# Patient Record
Sex: MALE | Race: WHITE | NOT HISPANIC OR LATINO | Employment: UNEMPLOYED | URBAN - METROPOLITAN AREA
[De-identification: names, ages, dates, MRNs, and addresses within clinical notes are randomized per-mention and may not be internally consistent; named-entity substitution may affect disease eponyms.]

---

## 2022-03-24 ENCOUNTER — MEDICAL CORRESPONDENCE (OUTPATIENT)
Dept: HEALTH INFORMATION MANAGEMENT | Facility: CLINIC | Age: 67
End: 2022-03-24
Payer: COMMERCIAL

## 2022-03-28 ENCOUNTER — TRANSCRIBE ORDERS (OUTPATIENT)
Dept: OTHER | Age: 67
End: 2022-03-28
Payer: COMMERCIAL

## 2022-03-28 DIAGNOSIS — K43.5 PARASTOMAL HERNIA WITHOUT OBSTRUCTION OR GANGRENE: Primary | ICD-10-CM

## 2022-03-29 ENCOUNTER — TELEPHONE (OUTPATIENT)
Dept: SURGERY | Facility: CLINIC | Age: 67
End: 2022-03-29
Payer: COMMERCIAL

## 2022-03-29 NOTE — TELEPHONE ENCOUNTER
"Patient has colostomy with parastomal hernia. (will see Gen Surgery for this). Colostomy was placed 1 year ago. Has rectal pain that \"makes his feet numb\". He states it is constant, dull pressure. Nothing makes this better or worse. He still has adequate output from his colostomy. Patient will see Dr. Franklin on June 14th.     Samantha Cordova RN BSN  RNCC Colon Rectal Surgery  [phone]    "

## 2022-03-30 NOTE — TELEPHONE ENCOUNTER
Diagnosis, Referred by & from: S/P colostomy, rectal pain   Appt date: 6/14/2022   NOTES STATUS DETAILS   OFFICE NOTE from referring provider Care Everywhere [facility]:  3/24/22 - GEN SURG OV with Dr. Porter   OFFICE NOTE from other specialist Care Everywhere [facility]:  3/31/22 - PCC OV with Des Whelan NP  3/4/22 - URO OV with Dr. Russo  12/3/21 - PCC OV with Des Whelan NP  3/23/21 - GI OV with Markell Allen NP  4/6/20 - GI OV with Dr. Parrish    [facility]:  3/23/18 - URO OV with Dr. Zuluaga   DISCHARGE SUMMARY from hospital Care Everywhere [facility]:  4/14/21 - Admission with Dr. Villaseñor  2/24/21 - Admission with Dr. Sebastian   DISCHARGE REPORT from the ER Care Everywhere [facility]:  4/27/21 - ED OV with Dr. Moreno  12/5/19 - ED OV with Dr. Sebastian  1/8/19 - ED OV with Dr. Pride  7/11/18 - ED OV with Dr. Ambrose  7/9/18 - ED OV with Dr. Javier   OPERATIVE REPORT Care Everywhere [facility]:  4/14/21 - OP Note for ROBOTIC COLOSTOMY with Dr. Villaseñor  4/28/20 - OP Note for MANOMETRY ANORECTAL with Dr. Reynolds   MEDICATION LIST Care Everywhere    LABS     BIOPSIES/PATHOLOGY RELATED TO DIAGNOSIS Care Everywhere [facility]:  2/27/20 - Colon Biopsy (Case: [id number])   DIAGNOSTIC PROCEDURES     COLONOSCOPY Care Everywhere [facility]:  2/27/20 - Colonoscopy    [facility]:  10/30/17 - Colonoscopy   IMAGING (DISC & REPORT)      CT Received [facility]:  4/27/21 - CT Abd/Pelvis  2/24/21 - CT Abd/Pelvis  12/5/19 - CT Abd/Pelvis  7/11/18 - CT Abd/Pelvis   MRI Received [facility]:  4/15/22 - MRI Pelvis   XRAY Received [facility]:  12/3/21 - XR Abdomen  9/28/21 - XR Abdomen  4/27/21 - XR Abdomen  8/2/20 - XR Abdomen  11/19/19 - XR Abdomen   ULTRASOUND  (ENDOANAL/ENDORECTAL) N/A      Records Requested  03/30/22    Facility  [facility]  Fax: [phone]   Outcome * 3/30/22 8:18 AM Faxed req to [facility] for images to be pushed to [facility]. - [place]    * 3/31/22 11:46 AM Imaging disc received from [facility] and sent to [facility] to  be uploaded into PACs. - [place]    * 5/17/22 6:48 AM Faxed urg req to [facility] for additional images to be sent to the clinic. - [place]    * 5/18/22 1:22 PM Imaging disc received from [facility] and sent to [facility] to be uploaded into PACs. - [place]

## 2022-06-07 ENCOUNTER — PATIENT OUTREACH (OUTPATIENT)
Dept: SURGERY | Facility: CLINIC | Age: 67
End: 2022-06-07
Payer: COMMERCIAL

## 2022-06-07 NOTE — PROGRESS NOTES
"Colon and Rectal Surgery Clinic Note    RE: Zach Costa.  : 1955.  TENZIN: 2022.    Reason for visit: rectal pain.    HPI: Zach \"Bruna" is a 66 year old male who presents today for rectal pain. Quentin has a spinal injury resulting in neurogenic bladder and bowel and underwent a robotic diverting sigmoid colostomy with Dr. Charley Villaseñor on 2021. Since then he has developed a parastomal hernia and has issues with chronic abdominal and rectal pain. A MRI was completed on 4/15/2022 which demonstrated no T2 signal abnormality or concerns. His last colonscopy was in  which revealed multiple adenomatous polyps.     Today Quentin feels like the pain is getting worse.  The pain he describes seems to be in his pelvis, rather than his rectum or anus, and sometimes traverses down to his toes.      MRI Pelvis (4/15/2022):   IMPRESSION:   1. The visualized lumbar sacral plexus nerve roots demonstrate no evidence for   enlargement. No high T2 signal abnormality. This is from the level L5-S1 to the   sacrum. If still clinically concerned contrast administration with full-field   pelvis may be helpful.   2. Boogie bulb is within the prostatic urethra.        Colonoscopy (2022):  Impression:  - The examined portion of the ileum was normal. Biopsied.  - The rectum, sigmoid colon, descending colon, transverse colon,   ascending colon and cecum are normal. Biopsied.  - Two 6 to 10 mm polyps in the ascending colon, removed with a hot   snare. Resected and retrieved.  - Two small polyps in the transverse colon, removed with a hot snare.   Resected and retrieved.  - Four 4 to 7 mm polyps at the recto-sigmoid colon and in the sigmoid   colon, removed with a hot snare. Resected and retrieved.  - One small polyp in the rectum, removed with a jumbo cold forceps.   Resected and retrieved.  - Non-bleeding internal hemorrhoids  - Lack of tone observed in entire colon.  - Decreased sphincter tone found on digital rectal " exam.      Surgical Pathology (2/27/2020):   A. Terminal ileum biopsies:  - No diagnostic abnormality.    B. Random colon biopsies:  - No diagnostic abnormality.    C. Ascending colon polyp:  - Tubular adenoma.    D. Transverse colon polyps:  - Tubular adenomas.    E. Rectosigmoid colon polyps:  - Tubular adenomas and hyperplastic polyps.       Medical history:  1. Spinal cord injury   2. Neurogenic bowel and bladder   3. HTN   4. Bilateral heel peripheral neuropathy     Surgical history:  1. s/p Gamma nail for right intertrochanteric femur fracture  2. Robotic diverting colostomy     Family history:  No IBD or GI malignancy    Medications:  -not on anticoagulation or steroids    Allergies:  Not on File    Social history:   -he is a retired   -he is still smoking but has cut down    ROS:  A complete review of systems was performed with the patient and all systems negative except as per HPI.    Physical Examination:  Exam was chaperoned by   There were no vitals taken for this visit.  General: Well hydrated. No acute distress.  Abdomen: Soft, NT, very large parastomal hernia, colostomy in LLQ, functioning  Perianal external examination:  Perianal skin: Intact with no excoriation or lichenification.  Lesions: No evidence of an external lesion, nodularity, or induration in the perianal region.  Eversion of buttocks: There was not evidence of an anal fissure. Details: N/A.  Skin tags or external hemorrhoids: None.  Digital rectal examination: Was performed.   Sphincter tone: Poor.  Palpable lesions: No.  Other: None.  Bimanual examination: was not performed.    Anoscopy: Was performed.   Hemorrhoids: No significant internal hemorrhoids.  Lesions: No    Procedures:  None.    ASSESSMENT  65 y/o gentleman with a colostomy and a very large parastomal hernia and what I believe in neurogenic pain in his pelvis.  No organic cause of rectal or pelvic pain identified.    Risks, benefits, and alternatives of operative  treatment were thoroughly discussed with the patient, he/she understands these well and agrees to proceed.    PLAN  1. Referral to parastomal hernia specialist  2. Referral to pain clinic   3. Advised smoking cessation    45 minutes spent on the date of the encounter doing chart review, history and exam, imaging review, documentation and further activities as noted above.      Rodriguez Franklin MD, PhD    Division of Colon and Rectal Surgery  [facility]    Referring Provider:  Samuel Porter MD  [facility] [place] [address]     Primary Care Provider:  No primary care provider on file.

## 2022-06-07 NOTE — PROGRESS NOTES
Called patient in an attempt to schedule a General Surgery consult for parastomal hernia. Patients mailbox is full and cannot accept messages. Patient can be scheduled with Tiffanie Del Real or Richie, next available opening.

## 2022-06-14 ENCOUNTER — PRE VISIT (OUTPATIENT)
Dept: SURGERY | Facility: CLINIC | Age: 67
End: 2022-06-14
Payer: COMMERCIAL

## 2022-06-14 ENCOUNTER — OFFICE VISIT (OUTPATIENT)
Dept: SURGERY | Facility: CLINIC | Age: 67
End: 2022-06-14
Payer: COMMERCIAL

## 2022-06-14 VITALS
WEIGHT: 193 LBS | HEIGHT: 71 IN | HEART RATE: 78 BPM | DIASTOLIC BLOOD PRESSURE: 85 MMHG | SYSTOLIC BLOOD PRESSURE: 140 MMHG | OXYGEN SATURATION: 96 % | BODY MASS INDEX: 27.02 KG/M2

## 2022-06-14 DIAGNOSIS — K62.89 RECTAL PAIN: ICD-10-CM

## 2022-06-14 DIAGNOSIS — K43.5 PARASTOMAL HERNIA WITHOUT OBSTRUCTION OR GANGRENE: ICD-10-CM

## 2022-06-14 DIAGNOSIS — R10.84 ABDOMINAL PAIN, GENERALIZED: Primary | ICD-10-CM

## 2022-06-14 PROCEDURE — 99204 OFFICE O/P NEW MOD 45 MIN: CPT | Performed by: SURGERY

## 2022-06-14 RX ORDER — ASPIRIN 325 MG
325 TABLET, DELAYED RELEASE (ENTERIC COATED) ORAL
COMMUNITY
Start: 2022-03-10

## 2022-06-14 RX ORDER — DICYCLOMINE HYDROCHLORIDE 10 MG/1
CAPSULE ORAL
COMMUNITY
Start: 2022-03-31

## 2022-06-14 RX ORDER — TRAMADOL HYDROCHLORIDE 50 MG/1
TABLET ORAL
COMMUNITY
Start: 2022-05-10

## 2022-06-14 RX ORDER — LACTULOSE 10 G/15ML
SOLUTION ORAL
COMMUNITY

## 2022-06-14 RX ORDER — LISINOPRIL 10 MG/1
10 TABLET ORAL
COMMUNITY
Start: 2021-04-08

## 2022-06-14 RX ORDER — METOCLOPRAMIDE 10 MG/1
TABLET ORAL
COMMUNITY

## 2022-06-14 RX ORDER — FAMOTIDINE 20 MG/1
TABLET, FILM COATED ORAL
COMMUNITY

## 2022-06-14 RX ORDER — DICYCLOMINE HYDROCHLORIDE 10 MG/1
10 CAPSULE ORAL
COMMUNITY
Start: 2022-03-31

## 2022-06-14 RX ORDER — HYDROCHLOROTHIAZIDE 12.5 MG/1
CAPSULE ORAL
COMMUNITY

## 2022-06-14 RX ORDER — ROPINIROLE 1 MG/1
1 TABLET, FILM COATED ORAL
COMMUNITY
Start: 2022-03-22

## 2022-06-14 RX ORDER — CYCLOBENZAPRINE HCL 10 MG
10 TABLET ORAL
COMMUNITY
Start: 2022-03-31

## 2022-06-14 RX ORDER — GABAPENTIN 300 MG/1
300 CAPSULE ORAL
COMMUNITY
Start: 2022-03-23

## 2022-06-14 RX ORDER — OXYCODONE AND ACETAMINOPHEN 5; 325 MG/1; MG/1
TABLET ORAL
COMMUNITY
Start: 2022-06-03

## 2022-06-14 RX ORDER — AMITRIPTYLINE HYDROCHLORIDE 10 MG/1
10 TABLET ORAL AT BEDTIME
COMMUNITY
Start: 2021-11-26

## 2022-06-14 RX ORDER — OXYCODONE HYDROCHLORIDE 5 MG/1
TABLET ORAL
COMMUNITY
Start: 2021-04-28

## 2022-06-14 RX ORDER — POLYETHYLENE GLYCOL 3350 17 G/17G
17 POWDER, FOR SOLUTION ORAL
COMMUNITY
Start: 2022-03-23

## 2022-06-14 RX ORDER — METHOCARBAMOL 500 MG/1
TABLET, FILM COATED ORAL
COMMUNITY

## 2022-06-14 RX ORDER — MAGNESIUM GLUCONATE 30 MG(550)
TABLET ORAL
COMMUNITY

## 2022-06-14 RX ORDER — MAGNESIUM CITRATE
SOLUTION, ORAL ORAL
COMMUNITY

## 2022-06-14 RX ORDER — DULOXETIN HYDROCHLORIDE 60 MG/1
CAPSULE, DELAYED RELEASE ORAL
COMMUNITY

## 2022-06-14 RX ORDER — OXYBUTYNIN CHLORIDE 10 MG/1
TABLET, EXTENDED RELEASE ORAL
COMMUNITY
Start: 2021-11-08

## 2022-06-14 RX ORDER — BACLOFEN 10 MG/1
TABLET ORAL
COMMUNITY

## 2022-06-14 RX ORDER — CEPHALEXIN 500 MG/1
CAPSULE ORAL
COMMUNITY
Start: 2021-09-16

## 2022-06-14 RX ORDER — HYDROXYZINE HYDROCHLORIDE 25 MG/1
25-50 TABLET, FILM COATED ORAL
COMMUNITY
Start: 2022-03-23

## 2022-06-14 RX ORDER — TROSPIUM CHLORIDE 20 MG/1
TABLET, FILM COATED ORAL
COMMUNITY
Start: 2022-01-04

## 2022-06-14 RX ORDER — MULTIPLE VITAMINS W/ MINERALS TAB 9MG-400MCG
TAB ORAL
COMMUNITY

## 2022-06-14 RX ORDER — AMOXICILLIN 250 MG
2 CAPSULE ORAL
COMMUNITY
Start: 2022-03-10

## 2022-06-14 RX ORDER — TAMSULOSIN HYDROCHLORIDE 0.4 MG/1
CAPSULE ORAL
COMMUNITY

## 2022-06-14 RX ORDER — CIPROFLOXACIN 500 MG/1
TABLET, FILM COATED ORAL
COMMUNITY

## 2022-06-14 ASSESSMENT — PAIN SCALES - GENERAL: PAINLEVEL: EXTREME PAIN (8)

## 2022-06-14 NOTE — NURSING NOTE
"Chief Complaint   Patient presents with     New Patient     S/p colostomy with rectal pain       Vitals:    06/14/22 1142   BP: (!) 140/85   BP Location: Right arm   Patient Position: Sitting   Cuff Size: Adult Regular   Pulse: 78   SpO2: 96%   Weight: 193 lb   Height: 5' 11\"       Body mass index is 26.92 kg/m .    Ellen Tijerina CMA    "

## 2022-06-14 NOTE — LETTER
"2022       RE: Zach Costa  [address]  [place] [address]     Dear Colleague,    Thank you for referring your patient, Zach Costa, to the [facility] [place] at [facility]. Please see a copy of my visit note below.    Colon and Rectal Surgery Clinic Note    RE: Zach Costa.  : 1955.  TENZIN: 2022.    Reason for visit: rectal pain.    HPI: Zach \"Bruna" is a 66 year old male who presents today for rectal pain. Quentin has a spinal injury resulting in neurogenic bladder and bowel and underwent a robotic diverting sigmoid colostomy with Dr. Charley Villaseñor on 2021. Since then he has developed a parastomal hernia and has issues with chronic abdominal and rectal pain. A MRI was completed on 4/15/2022 which demonstrated no T2 signal abnormality or concerns. His last colonscopy was in  which revealed multiple adenomatous polyps.     Today Quentin feels like the pain is getting worse.  The pain he describes seems to be in his pelvis, rather than his rectum or anus, and sometimes traverses down to his toes.      MRI Pelvis (4/15/2022):   IMPRESSION:   1. The visualized lumbar sacral plexus nerve roots demonstrate no evidence for   enlargement. No high T2 signal abnormality. This is from the level L5-S1 to the   sacrum. If still clinically concerned contrast administration with full-field   pelvis may be helpful.   2. Boogie bulb is within the prostatic urethra.        Colonoscopy (2022):  Impression:  - The examined portion of the ileum was normal. Biopsied.  - The rectum, sigmoid colon, descending colon, transverse colon,   ascending colon and cecum are normal. Biopsied.  - Two 6 to 10 mm polyps in the ascending colon, removed with a hot   snare. Resected and retrieved.  - Two small polyps in the transverse colon, removed with a hot snare.   Resected and retrieved.  - Four 4 to 7 mm polyps at the " recto-sigmoid colon and in the sigmoid   colon, removed with a hot snare. Resected and retrieved.  - One small polyp in the rectum, removed with a jumbo cold forceps.   Resected and retrieved.  - Non-bleeding internal hemorrhoids  - Lack of tone observed in entire colon.  - Decreased sphincter tone found on digital rectal exam.      Surgical Pathology (2/27/2020):   A. Terminal ileum biopsies:  - No diagnostic abnormality.    B. Random colon biopsies:  - No diagnostic abnormality.    C. Ascending colon polyp:  - Tubular adenoma.    D. Transverse colon polyps:  - Tubular adenomas.    E. Rectosigmoid colon polyps:  - Tubular adenomas and hyperplastic polyps.       Medical history:  1. Spinal cord injury   2. Neurogenic bowel and bladder   3. HTN   4. Bilateral heel peripheral neuropathy     Surgical history:  1. s/p Gamma nail for right intertrochanteric femur fracture  2. Robotic diverting colostomy     Family history:  No IBD or GI malignancy    Medications:  -not on anticoagulation or steroids    Allergies:  Not on File    Social history:   -he is a retired   -he is still smoking but has cut down    ROS:  A complete review of systems was performed with the patient and all systems negative except as per HPI.    Physical Examination:  Exam was chaperoned by   There were no vitals taken for this visit.  General: Well hydrated. No acute distress.  Abdomen: Soft, NT, very large parastomal hernia, colostomy in LLQ, functioning  Perianal external examination:  Perianal skin: Intact with no excoriation or lichenification.  Lesions: No evidence of an external lesion, nodularity, or induration in the perianal region.  Eversion of buttocks: There was not evidence of an anal fissure. Details: N/A.  Skin tags or external hemorrhoids: None.  Digital rectal examination: Was performed.   Sphincter tone: Poor.  Palpable lesions: No.  Other: None.  Bimanual examination: was not performed.    Anoscopy: Was performed.    Hemorrhoids: No significant internal hemorrhoids.  Lesions: No    Procedures:  None.    ASSESSMENT  67 y/o gentleman with a colostomy and a very large parastomal hernia and what I believe in neurogenic pain in his pelvis.  No organic cause of rectal or pelvic pain identified.    Risks, benefits, and alternatives of operative treatment were thoroughly discussed with the patient, he/she understands these well and agrees to proceed.    PLAN  1. Referral to parastomal hernia specialist  2. Referral to pain clinic   3. Advised smoking cessation    45 minutes spent on the date of the encounter doing chart review, history and exam, imaging review, documentation and further activities as noted above.      Rodriguez Franklin MD, PhD    Division of Colon and Rectal Surgery  [facility]    Referring Provider:  Samuel Porter MD  [facility] [place] [address]     Primary Care Provider:  No primary care provider on file.

## 2022-06-14 NOTE — PATIENT INSTRUCTIONS
Both general surgery and [facility] pain clinic will contact you to schedule   General Surgery: [phone]  [facility]: [phone]

## 2022-07-11 NOTE — TELEPHONE ENCOUNTER
REFERRAL INFORMATION:    Referring Provider: Heri Franklin    Referring Clinic:  [facility]    Reason for Visit/Diagnosis: hernia       FUTURE VISIT INFORMATION:    Appointment Date: 7.25.22    Appointment Time: 3 PM     NOTES RECORD STATUS  DETAILS   OFFICE NOTE from Referring Provider Internal Heri Franklin  6.14.22 [facility]   OFFICE NOTE from Other Specialists Care Everywhere 3.24.22 [facility] DISCHARGE SUMMARY/ ED VISITS  N/A    OPERATIVE REPORT N/A    ENDOSCOPY (EGD)  N/A    PERTINENT LABS Internal    PATHOLOGY REPORTS (RELATED) N/A    IMAGING (CT, MRI, US, XR)  Received 4.15.22 MR pelvis  4.27.21, 2.24.21, 12.5.19, 7.11.18 CT ab pelvis  12.3.21, 9.28.21, 4.27.21 XR ab  More in Epic if needed

## 2022-07-22 ENCOUNTER — CARE COORDINATION (OUTPATIENT)
Dept: SURGERY | Facility: CLINIC | Age: 67
End: 2022-07-22

## 2022-07-25 ENCOUNTER — OFFICE VISIT (OUTPATIENT)
Dept: SURGERY | Facility: CLINIC | Age: 67
End: 2022-07-25
Attending: SURGERY
Payer: COMMERCIAL

## 2022-07-25 ENCOUNTER — PRE VISIT (OUTPATIENT)
Dept: SURGERY | Facility: CLINIC | Age: 67
End: 2022-07-25

## 2022-07-25 VITALS
HEIGHT: 71 IN | WEIGHT: 193.5 LBS | HEART RATE: 74 BPM | DIASTOLIC BLOOD PRESSURE: 91 MMHG | OXYGEN SATURATION: 95 % | BODY MASS INDEX: 27.09 KG/M2 | SYSTOLIC BLOOD PRESSURE: 171 MMHG

## 2022-07-25 DIAGNOSIS — K43.5 PARASTOMAL HERNIA WITHOUT OBSTRUCTION OR GANGRENE: Primary | ICD-10-CM

## 2022-07-25 PROCEDURE — 99204 OFFICE O/P NEW MOD 45 MIN: CPT | Performed by: SURGERY

## 2022-07-25 RX ORDER — ACETAMINOPHEN 325 MG/1
325-650 TABLET ORAL EVERY 6 HOURS PRN
COMMUNITY

## 2022-07-25 RX ORDER — OMEGA-3 FATTY ACIDS/FISH OIL 300-1000MG
200 CAPSULE ORAL EVERY 4 HOURS PRN
COMMUNITY

## 2022-07-25 RX ORDER — CEFAZOLIN SODIUM 2 G/50ML
2 SOLUTION INTRAVENOUS
Status: CANCELLED | OUTPATIENT
Start: 2022-07-25

## 2022-07-25 RX ORDER — CEFAZOLIN SODIUM 2 G/50ML
2 SOLUTION INTRAVENOUS SEE ADMIN INSTRUCTIONS
Status: CANCELLED | OUTPATIENT
Start: 2022-07-25

## 2022-07-25 ASSESSMENT — PAIN SCALES - GENERAL: PAINLEVEL: SEVERE PAIN (7)

## 2022-07-25 NOTE — PATIENT INSTRUCTIONS
You met with Dr. Doyle Del Real.      Today's visit instructions:    Reminder:  Surgery Requirements  Your surgery will be at [address] [place] [address]  You will need to arrive 2 hours early.  You will need someone to drive you home (over 18 years old) and stay with you for 24 hours after the procedure.  You will need a preop physical with PAC (pre-anesthesia care) within 30 days of surgery- closer is always better.  Stop any blood thinners, vitamins, minerals, or herbal supplements 5 days before surgery.  If you are taking a prescribed blood thinner please let us know for specific instructions.  Fasting- a nurse from [facility] will call you 1-2 days before surgery to confirm your procedure and tell you when to stop eating and drinking.   Wash with the soap (Antibacterial, Dial Complete Foam, Hibiclense, or soap given/mailed from the clinic) the night before surgery and morning of surgery. See instructions in the Surgery Packet.  You will need to undergo a COVID-19 test 2-4 days prior to your scheduled procedure.  Please see the handout . Our surgery scheduler can help arrange testing if you do not have a home test.  If you would like a procedure estimate please call Cost of Care at [phone].       If you have questions please contact Chloé RN or Alysa RN during regular clinic hours, Monday through Friday 7:30 AM - 4:00 PM, or you can contact us via [employer] at anytime.       If you have urgent needs after-hours, weekends, or holidays please call the hospital at [phone] and ask to speak with our on-call General Surgery Team.    Appointment [phone]  Nurse Advice (Chloé or Alysa): [phone]   Surgery Scheduler ([place]): [phone]  Fax: [phone]    After Your Laparoscopic Parastomal Hernia Repair     Incision care   You may take a shower the day after surgery. Carefully wash your incision with soap and water. Do not submerge yourself in water (bath, whirlpool, hot tub, pool, lake) for 14  days after surgery.   Remove the bandage the day after surgery, but leave the medical tape (Steri-Strips) or glue in place. These will loosen and fall off on their own 1-2 weeks after surgery.     Always wash your hands before touching your incisions or removing bandages.   It is not unusual to form a collection of fluid or blood under your incision that may feel firm or squishy- it can take several weeks to months for your body to reabsorb it.  At times, it may even drain.  If that should happen keep the area clean with soap, water,  and cover with a clean gauze dressing. You can change this daily or as needed.     Other medicines   Wait to start aspirin or blood thinners until the day after surgery. You can continue your regular medicines at your normal time the day after surgery.    Your pain medicine may cause constipation (hard, dry stools). To help with this, take the stool softener your doctor gave you or an over-the-counter stool softener or laxative. You can stop taking this when you are no longer taking pain medicine and your bowel movements are back to normal.      For pain or discomfort  Take the narcotic pain medicine your doctor gave you as needed and as instructed on the bottle. If you prefer to use over-the-counter medication, use acetaminophen (Tylenol) or ibuprofen (Advil, Motrin) as instructed on the box. Do not take Tylenol if it is in your narcotic pain medication.    Use an ice pack on your abdomen (belly) for 20 minutes at a time as needed for the first 24 hours. Be sure to protect your skin by putting a cloth between the ice pack and your skin.   After 24 hours you can switch to heat for 20 minutes as needed. Be sure to protect your skin by putting a cloth between the heat pack and your skin.    You may experience right shoulder pain after surgery which will go away 1-4 days after your procedure.  This is related to the gas that was used to inflate your abdomen, it gets trapped between your  liver and diaphragm. Please walk frequently and apply a heating pad to the area protecting your skin with a barrier such as a towel.       Activities   No driving until you feel it s safe to do so. Don t drive while taking narcotic pain medicine.   Don t lift anything heavier than 20 pounds for 3 to 4 weeks after surgery.      Special equipment   Wear your abdominal binder for the first 30 days after surgery. You don t have to wear it when you re sleeping. You can wear it longer than 30 days if you wish.        Diet   You can eat your regular meals after surgery.   Preparation day prior to surgery.  Clear liquid diet and Magnesium Citrate  YES    Magnesium Citrate Bowel Prep      Magnesium Citrate is available over-the-counter (without prescription) at all pharmacies        The day before surgery:    You may not eat any solid food the day before your procedure.  You will be asked to start a clear liquid diet.  Please follow the attached recommended diet and do NOT drink anything red or purple, dairy products, orange or grapefruit juice  At 1 PM the day prior to surgery, drink one bottle of Magnesium Citrate  Drink at least 2-3 eight ounce glasses of clear liquids an hour after taking the Magnesium Citrate  Do not eat or drink anything after midnight the evening prior to surgery.          Clear Liquid Diet Instructions    In preparation for your procedure, your doctor may prescribe a clear liquid diet along with a laxative to completely clear the colon of stool and fluid.   A diet of clear liquids consists exclusively of products that are liquid and transparent at room temperature. The fluids don't need to be entirely clear, but you should be able to see through them. The following food and drinks are allowed prior to a colon procedure:  water   clear broth (beef or chicken)   fat-free consommé   juices (apple, prune, white grape, and cider) without pulp   noncarbonated, powder-based beverages such as Fish, lemonade,  and Shahid-Aid   sodas (Sprite, 7-Up, ginger ale, and seltzer)   coffee or tea (without milk or cream)   clear gelatin (without fruit pieces and no red or purple jello)   popsicles (without fruit pieces or cream)   fruit ices   clear, hard candies   salt, pepper, and sugar      Although they're liquid, the following foods are not translucent enough to be permitted on a clear liquid diet before a colon procedure:  milk   cream   milkshakes   tomato juice   orange juice   grapefruit juice   cream soups   any soup other than broth   oatmeal   farina    You need to avoid anything with red or purple coloring -- gelatin, popsicles, and hard candies.    A clear liquid diet, such as the sample one described below, allows more food choices than you might think and can provide about 1,000 calories a day.  Breakfast -- 8 oz of apple juice, 1 cup of gelatin (without fruit), 1 cup of coffee with sugar but no milk or cream  Lunch --1 cup of chicken consommé, 8 oz of ginger ale, 1 cup of fruit ice, hot tea with sugar and lemon (no pulp)  Snack -- 1 or 2 clear hard candies  Dinner -- 1 cup of beef broth, 8 oz of lemonade, 1 cup of gelatin (without fruit), hot tea with sugar and lemon (no pulp)   Snack -- Popsicle         When to call the doctor   Call your doctor if you have:   A fever above 101 F (38.3 C) (taken under the tongue), or a fever or chills lasting more than a day.   Redness at the incision site.   Any fluid or blood draining from the incision, especially if it smells bad. Severe pain that doesn t improve with pain medicine.      We will call you 2 to 4 days after surgery to review this handout, answer questions and help arrange after-surgery care. If you have questions or concerns, please call [phone] during regular office hours. If you need to call after business hours, call [phone] and ask to page the surgeon on-call.            Transversus Abdominis Plane (TAP) Pain Block      What is a TAP block?   A TAP  block can help you manage your pain after surgery. TAP stands for transversus abdominis plane, which is a muscle layer in your abdomen (belly). The TAP block uses numbing medicine similar to Novocaine to block pain near the site of your surgery.       Why get a TAP block?   To better manage your pain after surgery. A tap block will help keep the pain from getting severe and out of control.   To block pain signals from the nerve, which helps decrease pain after surgery.   To help you sleep, easily breathe deeply, walk and visit with others.      How is it done?   You will lie still on a table. We will use an ultrasound machine to help us see the correct muscle layer of your abdomen. Then, we ll use a needle to inject the medicine. We may also give you some sleep medicine to lessen the pain of the injection.       The procedure takes between 5 and 15 minutes. It is usually done right before surgery, but will sometimes be after. It depends on your surgery and care needs.      What can I expect?   You may feel numbness, tingling or a heaviness in your abdomen.    You may have pain control up to 72 hours after surgery.   The TAP block may not lessen all of your surgery pain. But most patients feel 50 to 75 percent less pain than without the block.       Tell your nurse if you have:   Numbness or tingling in areas other than where the injection was   Blurry vision   Ringing in your ears   A metallic taste in your mouth

## 2022-07-25 NOTE — LETTER
7/25/2022       RE: Zach Costa  [address]  [facility] [address]     Dear Colleague,    Thank you for referring your patient, Zach Costa, to the [facility] GENERAL SURGERY CLINIC [place] at [facility]. Please see a copy of my visit note below.    General Surgery Clinic:    HISTORY OF PRESENT ILLNESS:  Zach Costa is a 67-year-old male who presents with a large parastomal hernia and laxity of the right upper flank wall.  The patient's left lower quadrant hernia is the size approximately 25 x 25 x 25 cm.  The ostomy itself is pink, and the patient has significant pain and pain associated with the right flank weakness.  The patient also has a recent right femoral fracture and some disability with the security of his hip with multiple hip dislocations.  The patient has significant back pain and other abnormalities associated with his original traumatic injury.  The patient has been cared for in [place] by Dr. Villaseñor for her colorectal disease.  The patient is now being cared for by Dr. Franklin at the [facility].  The patient was referred to General Surgery for possible repair of the parastomal hernia.  The patient notes difficulty in bowel preps and difficulty passing stool with the hernia.  The patient understands the potential for enlargement of the hernia, and he is alert to issues associated with incarceration of the hernia.    ROS: a ten point ROS is negative.     PAST MEDICAL HISTORY:  No past medical history on file.     PAST SURGICAL HISTORY:  No past surgical history on file.     MEDICATIONS:  Current Outpatient Medications   Medication     acetaminophen (TYLENOL) 325 MG tablet     ibuprofen (ADVIL/MOTRIN) 200 MG capsule     lisinopril (ZESTRIL) 10 MG tablet     oxyCODONE (ROXICODONE) 5 MG tablet     rOPINIRole (REQUIP) 1 MG tablet     amitriptyline (ELAVIL) 10 MG tablet     aspirin (ASA) 325 MG EC tablet      "baclofen (LIORESAL) 10 MG tablet     cephALEXin (KEFLEX) 500 MG capsule     ciprofloxacin (CIPRO) 500 MG tablet     cyclobenzaprine (FLEXERIL) 10 MG tablet     dicyclomine (BENTYL) 10 MG capsule     dicyclomine (BENTYL) 10 MG capsule     DULoxetine (CYMBALTA) 60 MG capsule     famotidine (PEPCID) 20 MG tablet     gabapentin (NEURONTIN) 300 MG capsule     hydrochlorothiazide (MICROZIDE) 12.5 MG capsule     hydrOXYzine (ATARAX) 25 MG tablet     lactulose (CHRONULAC) 10 GM/15ML solution     linaclotide (LINZESS) 290 MCG capsule     Magnesium Citrate (CITRATE OF MAGNESIA) SOLN     Magnesium Gluconate 550 MG TABS     methocarbamol (ROBAXIN) 500 MG tablet     metoclopramide (REGLAN) 10 MG tablet     multivitamin w/minerals (THERA-VIT-M) tablet     oxybutynin ER (DITROPAN XL) 10 MG 24 hr tablet     oxyCODONE-acetaminophen (PERCOCET) 5-325 MG tablet     polyethylene glycol (MIRALAX) 17 GM/Dose powder     senna-docusate (SENOKOT-S/PERICOLACE) 8.6-50 MG tablet     tamsulosin (FLOMAX) 0.4 MG capsule     traMADol (ULTRAM) 50 MG tablet     trospium (SANCTURA) 20 MG tablet     No current facility-administered medications for this visit.        ALLERGIES:  No Known Allergies     SOCIAL HISTORY:  Social History     Socioeconomic History     Marital status: Single     Spouse name: None     Number of children: None     Years of education: None     Highest education level: None   Tobacco Use     Smoking status: Current Every Day Smoker     Types: Cigarettes     Smokeless tobacco: Never Used       FAMILY HISTORY:  No family history on file.     PHYSICAL EXAM:  Vital Signs: BP (!) 171/91 (BP Location: Left arm, Patient Position: Sitting, Cuff Size: Adult Regular)   Pulse 74   Ht 1.803 m (5' 11\")   Wt 87.8 kg (193 lb 8 oz)   SpO2 95%   BMI 26.99 kg/m    GEN: Zach Costa moves easily to the examination table.    ABD: His abdomen is soft and nontender.  The large parastomal hernia, approximately 25 x 25 x 25 cm, is fully reducible.  " The ostomy is pink.  Abdomen is soft without abnormalities.  The patient has no organomegaly or evidence of other issues other than a laxity of the right anterolateral abdominal wall.    EXT: Extremities are warm and well perfused.    IMPRESSION:  Zach Costa, a 67-year-old male, presents with a large parastomal hernia.  He would like to have this repaired.  I have discussed with the patient the options for laparoscopic versus open hernia repair, the likely need to use permanent mesh, and the potential for mesh integrating into the intestine as a complication that would lead to need to remove the mesh, the potential for hernia recurrence in approximately 40% of cases, the potential for injury to the intra-abdominal organs, small intestine, large intestine, liver, spleen, the potential for complications such as myocardial infarction, pulmonary emboli, stroke, even death in the operating room were discussed with the patient and the patient's brother in the Surgery Clinic.  The patient understood these risks and wished to proceed.  The patient was encouraged to be interviewed by the Anesthesia Preoperative Team.  We hope he will pursue this.  The patient was encouraged to do a bowel prep prior to surgery.  The patient would like to proceed this fall to have the hernia repaired either laparoscopically if possible or open if necessary, possibly also some repair would be made to tighten the muscles of the right anterolateral chest wall as the patient has significant laxity here, most likely related to his injury or traumatic injury or surgical issues associated with his colon surgery.        Sincerely,    Doyle Del Real MD

## 2022-07-25 NOTE — NURSING NOTE
"Chief Complaint   Patient presents with     New Patient     Parastomal hernia       Vitals:    07/25/22 1515   BP: (!) 171/91   BP Location: Left arm   Patient Position: Sitting   Cuff Size: Adult Regular   Pulse: 74   SpO2: 95%   Weight: 87.8 kg (193 lb 8 oz)   Height: 1.803 m (5' 11\")       Body mass index is 26.99 kg/m .                          Jonathan Nicole, EMT    "

## 2022-07-25 NOTE — PROGRESS NOTES
General Surgery Clinic:    HISTORY OF PRESENT ILLNESS:  Zach Costa is a 67-year-old male who presents with a large parastomal hernia and laxity of the right upper flank wall.  The patient's left lower quadrant hernia is the size approximately 25 x 25 x 25 cm.  The ostomy itself is pink, and the patient has significant pain and pain associated with the right flank weakness.  The patient also has a recent right femoral fracture and some disability with the security of his hip with multiple hip dislocations.  The patient has significant back pain and other abnormalities associated with his original traumatic injury.  The patient has been cared for in [place] by Dr. Villaseñor for her colorectal disease.  The patient is now being cared for by Dr. Franklin at the [facility].  The patient was referred to General Surgery for possible repair of the parastomal hernia.  The patient notes difficulty in bowel preps and difficulty passing stool with the hernia.  The patient understands the potential for enlargement of the hernia, and he is alert to issues associated with incarceration of the hernia.    ROS: a ten point ROS is negative.     PAST MEDICAL HISTORY:  No past medical history on file.     PAST SURGICAL HISTORY:  No past surgical history on file.     MEDICATIONS:  Current Outpatient Medications   Medication    acetaminophen (TYLENOL) 325 MG tablet    ibuprofen (ADVIL/MOTRIN) 200 MG capsule    lisinopril (ZESTRIL) 10 MG tablet    oxyCODONE (ROXICODONE) 5 MG tablet    rOPINIRole (REQUIP) 1 MG tablet    amitriptyline (ELAVIL) 10 MG tablet    aspirin (ASA) 325 MG EC tablet    baclofen (LIORESAL) 10 MG tablet    cephALEXin (KEFLEX) 500 MG capsule    ciprofloxacin (CIPRO) 500 MG tablet    cyclobenzaprine (FLEXERIL) 10 MG tablet    dicyclomine (BENTYL) 10 MG capsule    dicyclomine (BENTYL) 10 MG capsule    DULoxetine (CYMBALTA) 60 MG capsule    famotidine (PEPCID) 20 MG tablet    gabapentin  "(NEURONTIN) 300 MG capsule    hydrochlorothiazide (MICROZIDE) 12.5 MG capsule    hydrOXYzine (ATARAX) 25 MG tablet    lactulose (CHRONULAC) 10 GM/15ML solution    linaclotide (LINZESS) 290 MCG capsule    Magnesium Citrate (CITRATE OF MAGNESIA) SOLN    Magnesium Gluconate 550 MG TABS    methocarbamol (ROBAXIN) 500 MG tablet    metoclopramide (REGLAN) 10 MG tablet    multivitamin w/minerals (THERA-VIT-M) tablet    oxybutynin ER (DITROPAN XL) 10 MG 24 hr tablet    oxyCODONE-acetaminophen (PERCOCET) 5-325 MG tablet    polyethylene glycol (MIRALAX) 17 GM/Dose powder    senna-docusate (SENOKOT-S/PERICOLACE) 8.6-50 MG tablet    tamsulosin (FLOMAX) 0.4 MG capsule    traMADol (ULTRAM) 50 MG tablet    trospium (SANCTURA) 20 MG tablet     No current facility-administered medications for this visit.        ALLERGIES:  No Known Allergies     SOCIAL HISTORY:  Social History     Socioeconomic History    Marital status: Single     Spouse name: None    Number of children: None    Years of education: None    Highest education level: None   Tobacco Use    Smoking status: Current Every Day Smoker     Types: Cigarettes    Smokeless tobacco: Never Used       FAMILY HISTORY:  No family history on file.     PHYSICAL EXAM:  Vital Signs: BP (!) 171/91 (BP Location: Left arm, Patient Position: Sitting, Cuff Size: Adult Regular)   Pulse 74   Ht 1.803 m (5' 11\")   Wt 87.8 kg (193 lb 8 oz)   SpO2 95%   BMI 26.99 kg/m    GEN: Zach Costa moves easily to the examination table.    ABD: His abdomen is soft and nontender.  The large parastomal hernia, approximately 25 x 25 x 25 cm, is fully reducible.  The ostomy is pink.  Abdomen is soft without abnormalities.  The patient has no organomegaly or evidence of other issues other than a laxity of the right anterolateral abdominal wall.    EXT: Extremities are warm and well perfused.    IMPRESSION:  Zach Costa, a 67-year-old male, presents with a large parastomal hernia.  He would like to have this " repaired.  I have discussed with the patient the options for laparoscopic versus open hernia repair, the likely need to use permanent mesh, and the potential for mesh integrating into the intestine as a complication that would lead to need to remove the mesh, the potential for hernia recurrence in approximately 40% of cases, the potential for injury to the intra-abdominal organs, small intestine, large intestine, liver, spleen, the potential for complications such as myocardial infarction, pulmonary emboli, stroke, even death in the operating room were discussed with the patient and the patient's brother in the Surgery Clinic.  The patient understood these risks and wished to proceed.  The patient was encouraged to be interviewed by the Anesthesia Preoperative Team.  We hope he will pursue this.  The patient was encouraged to do a bowel prep prior to surgery.  The patient would like to proceed this fall to have the hernia repaired either laparoscopically if possible or open if necessary, possibly also some repair would be made to tighten the muscles of the right anterolateral chest wall as the patient has significant laxity here, most likely related to his injury or traumatic injury or surgical issues associated with his colon surgery.

## 2022-07-25 NOTE — NURSING NOTE
Pre and Post op Patient Education/Teaching Flowsheet  Relevant Diagnosis:  Parastomal hernia  Teaching Topic:  Pre and post op teaching  Person(s) Involved in teaching:  Patient and Friend     Motivation Level:  Asks Questions:  Yes  Eager to Learn:  Yes  Cooperative:  Yes  Receptive (willing/able to accept information):  Yes  Any cultural factors/Congregation beliefs that may influence understanding or compliance?  No    Patient/caregiver/family demonstrates understanding of the following:  Reason for the appointment, diagnosis, and treatment plan:  Yes  Patient demonstrates understanding of the following:  Pre-op bowel prep:  Yes  Post-op pain management recommendations (medications, ice compress, binder/athletic supporter (if applicable), etc.:  Yes  Inguinal hernia patients:  Post-op urinary retention- discussed signs/symptoms and visit to ER for Boogie catheter placement and to stay in place for at least 48 hours:  NA  Restrictions:  Yes  Medications to take the day of surgery:  Per PCP  Blood thinner medications discussed and when to stop (if applicable):  Yes  Wound care:  Yes  Diabetes medication management (if applicable):  Per PCP  Which situations necessitate calling provider and whom to contact:  Discussed how to contact the hospital, nurse, and clinic scheduling staff if necessary      Date and time of surgery:  TBD  Location of surgery: [address] [place] [address]  History and Physical and any other testing necessary prior to surgery:  Yes  Required time line for completion of History and Physical and any pre-op testing:  Yes  Discuss need for someone to drive patient home and stay with them for 24 hours:  Yes  Pre-op showering/scrub information with Surgical Scrub:  Yes  NPO Guidelines:  NPO per Anesthesia Guidelines  COVID-19 Testing:  Yes    Infection Prevention: Patient demonstrates understanding of the following:  Patient instructed on hand hygiene:  Yes  Surgical procedure site care will be taught and  will be reviewed at the time of discharge  Signs and symptoms of infection taught:  Yes  Wound care reviewed and will be taught at the time of discharge  Central venous catheter care will be taught at the time of discharge (if applicable)    Post-op follow-up:  Instructional materials used/given/mailed:  Surgical logistics, post op teaching sheet, and surgical scrub

## 2022-07-26 ENCOUNTER — PATIENT OUTREACH (OUTPATIENT)
Dept: SURGERY | Facility: CLINIC | Age: 67
End: 2022-07-26

## 2022-07-26 NOTE — PROGRESS NOTES
Called Quentin and AMPARO to return call. Dr. Del Real ordered a CT and patient will need to have that completed prior to surgery. He can have this done at a facility closer to him if he chooses.     Also, Dr. Del Real noticed that patient is a current smoker. He will need to achieve smoking cessation prior to surgery. Needing to review this with the patient as well. He should see his PCP for help with smoking cessation.     ADDENDUM  07/27/22  9:09 AM  Patient returned call. He is going to get the CT done at [facility] in [place], [place]. Will fax order to them. Advised to call the General Surgery Clinic back when he gets the CT done so the images can be pushed to [facility]. Also advised patient that he needs to achieve smoking cessation prior to hernia repair. He is already working on quitting since it gives him heartburn every time he smokes.

## 2022-07-29 ENCOUNTER — TELEPHONE (OUTPATIENT)
Dept: SURGERY | Facility: CLINIC | Age: 67
End: 2022-07-29

## 2022-07-29 NOTE — TELEPHONE ENCOUNTER
Attempted to contact patient in order to schedule surgery with Dr. Del Real, no answer. Unable to leave voicemail as mailbox is full.

## 2022-08-08 ENCOUNTER — TRANSFERRED RECORDS (OUTPATIENT)
Dept: HEALTH INFORMATION MANAGEMENT | Facility: CLINIC | Age: 67
End: 2022-08-08

## 2022-08-12 ENCOUNTER — PRE VISIT (OUTPATIENT)
Dept: SURGERY | Facility: CLINIC | Age: 67
End: 2022-08-12

## 2022-09-07 ENCOUNTER — PATIENT OUTREACH (OUTPATIENT)
Dept: SURGERY | Facility: CLINIC | Age: 67
End: 2022-09-07

## 2022-09-07 NOTE — PROGRESS NOTES
Returned patients call. Disc and report that been received and is downloaded into the [facility]. Reviewed results with the patient. Patients case is planned to be reviewed at Hernia Conference on 9/28.    Patient states that he has stopped smoking with his last cigarette ~ 2 weeks ago; however, continues to use nicotine supplements.  Discussed with the patient that all products will need to be stopped prior to undergoing urine nicotine testing.    All questions were answered.

## 2022-09-29 ENCOUNTER — PATIENT OUTREACH (OUTPATIENT)
Dept: SURGERY | Facility: CLINIC | Age: 67
End: 2022-09-29

## 2022-09-29 NOTE — PROGRESS NOTES
"09/29/22    10:02 AM     Complex Hernia Monthly Conference Note   Date: September 29, 2022    Attendees: Dr. Alexander Woods, Dr. Steve Cornelius, Dr. Doyle Del Real, Dr. Jluis Weston, Dr. Josias Quiroz, Dr. Malika Brumfield, Chloé Jaeger RN and Azalia Corona RN    Type of hernia: Parastomal    Estimated body mass index is 26.99 kg/m  as calculated from the following:    Height as of 7/25/22: 1.803 m (5' 11\").    Weight as of 7/25/22: 87.8 kg (193 lb 8 oz).    Wt Readings from Last 4 Encounters:   07/25/22 87.8 kg (193 lb 8 oz)   06/14/22 87.5 kg (193 lb)       History   Smoking Status     Current Every Day Smoker     Types: Cigarettes   Smokeless Tobacco     Never Used       Weight at time of initial consult: 193 pounds    HgbA1C: No results found for: A1C    No results found for: ALBUMIN    Is this a re-occurrence of hernia: Yes    Is mesh in place: Unknown- requesting OP report  Has there been multiple abdominal surgeries/previous repair: Yes    Fistulas: No  Is hernia is greater than 5 cm: Yes  Multiple hernias present: No  Is patient immunosuppressed: No  PAST MEDICAL HISTORY: No past medical history on file.    PAST SURGICAL HISTORY: No past surgical history on file.    Patient Plan:    CT reviewed: YES    Nictotine/Continine Test needed: Yes    1.  Patient will need to be nicotine free prior to considering hernia repair.  2.  Need to obtain Operative Report from previous parastomal hernia repair (Dr. HAKEEM Villaseñor). Not in CareEverywhere. Patient will need to help to obtain.  3.  Dr. Del Real will discuss case with Dr. Kiser to determine if ostomy reversal is an option.  4.  Per team, if the patient is not experiencing pouching issues, it would be best not to repair the hernia.  If issues, would consider open repair with mesh.  5.  Schedule video visit with Dr. Del Real on 10/3 at 0730.    Called Dr. Villaseñor's office and was transferred to Medical Records. They were unable to locate OP report. Facility requested that we send a " faxed request ([phone]) and they will search further.    Discussed above with the patient. He is unsure of the facility or date of surgery.

## 2022-10-03 ENCOUNTER — VIRTUAL VISIT (OUTPATIENT)
Dept: SURGERY | Facility: CLINIC | Age: 67
End: 2022-10-03
Payer: COMMERCIAL

## 2022-10-03 VITALS — WEIGHT: 195 LBS | BODY MASS INDEX: 27.3 KG/M2 | HEIGHT: 71 IN

## 2022-10-03 DIAGNOSIS — K43.3 PARASTOMAL HERNIA WITH OBSTRUCTION AND WITHOUT GANGRENE: Primary | ICD-10-CM

## 2022-10-03 PROCEDURE — 99214 OFFICE O/P EST MOD 30 MIN: CPT | Mod: 95 | Performed by: SURGERY

## 2022-10-03 ASSESSMENT — PAIN SCALES - GENERAL: PAINLEVEL: SEVERE PAIN (6)

## 2022-10-03 NOTE — LETTER
Date:October 4, 2022      Provider requested that no letter be sent. Do not send.       [facility]

## 2022-10-03 NOTE — LETTER
10/3/2022       RE: Zach Costa  [address]  [place] [address]     Dear Colleague,    Thank you for referring your patient, Zach Costa, to the [facility] GENERAL SURGERY CLINIC [place] at [facility]. Please see a copy of my visit note below.    Zach Costa is a 67 year old who is being evaluated via a billable video visit.      How would you like to obtain your AVS? MyChart  If the video visit is dropped, the invitation should be resent by: Text to cell phone: [phone]  Will anyone else be joining your video visit? No  If patient encounters technical issues they should call [phone]    During this virtual visit the patient is located in [place], patient verifies this as the location during the entirety of this visit.     Video-Visit Details  Video Start Time: 0735    Type of service:  Video Visit    Video End Time:7:54 AM    Originating Location (pt. Location): Home  Distant Location (provider location):  [facility]  Platform used for Video Visit: MARIA Shoemaker 10/3/2022      7:08 AM    HISTORY OF PRESENT ILLNESS:  Quentin Costa is a 67-year-old male well known to the Surgery Service, who presents with a large symptomatic parastomal hernia.  The patient has difficulty pouching his ostomy with very frequent leakage and need to re-tape and cover the ostomy to prevent additional leakage throughout the day.  The patient also has discomfort and has difficulty with his daily tasks because of the size and symptomatology of the parastomal hernia.  The patient is quite interested to pursue repair and he has been encouraged to participate in smoking cessation, which he is doing and making progress on a regular basis.  He is still occasionally using #1 cigarette per day and otherwise going with none.  The patient understands the need to maintain or lose weight and to continue to prepare should surgery become an  option to repair the parastomal hernia repair.       MEDICATIONS:  Current Outpatient Medications   Medication     acetaminophen (TYLENOL) 325 MG tablet     amitriptyline (ELAVIL) 10 MG tablet     aspirin (ASA) 325 MG EC tablet     baclofen (LIORESAL) 10 MG tablet     cephALEXin (KEFLEX) 500 MG capsule     ciprofloxacin (CIPRO) 500 MG tablet     cyclobenzaprine (FLEXERIL) 10 MG tablet     dicyclomine (BENTYL) 10 MG capsule     dicyclomine (BENTYL) 10 MG capsule     DULoxetine (CYMBALTA) 60 MG capsule     famotidine (PEPCID) 20 MG tablet     gabapentin (NEURONTIN) 300 MG capsule     hydrochlorothiazide (MICROZIDE) 12.5 MG capsule     hydrOXYzine (ATARAX) 25 MG tablet     ibuprofen (ADVIL/MOTRIN) 200 MG capsule     lactulose (CHRONULAC) 10 GM/15ML solution     linaclotide (LINZESS) 290 MCG capsule     lisinopril (ZESTRIL) 10 MG tablet     Magnesium Citrate (CITRATE OF MAGNESIA) SOLN     Magnesium Gluconate 550 MG TABS     methocarbamol (ROBAXIN) 500 MG tablet     metoclopramide (REGLAN) 10 MG tablet     multivitamin w/minerals (THERA-VIT-M) tablet     oxybutynin ER (DITROPAN XL) 10 MG 24 hr tablet     polyethylene glycol (MIRALAX) 17 GM/Dose powder     rOPINIRole (REQUIP) 1 MG tablet     senna-docusate (SENOKOT-S/PERICOLACE) 8.6-50 MG tablet     tamsulosin (FLOMAX) 0.4 MG capsule     trospium (SANCTURA) 20 MG tablet     oxyCODONE (ROXICODONE) 5 MG tablet     oxyCODONE-acetaminophen (PERCOCET) 5-325 MG tablet     traMADol (ULTRAM) 50 MG tablet     No current facility-administered medications for this visit.        ALLERGIES:  No Known Allergies     SOCIAL HISTORY:  Social History     Socioeconomic History     Marital status: Single     Spouse name: None     Number of children: None     Years of education: None     Highest education level: None   Tobacco Use     Smoking status: Current Some Day Smoker     Types: Cigarettes     Smokeless tobacco: Never Used     Tobacco comment: Trying to quit     Vital Signs: Ht 1.803 m  "(5' 11\")   Wt 88.5 kg (195 lb)   BMI 27.20 kg/m   No examination was possible as this was a telephone visit.      IMPRESSION:  Quentin Costa, a 67-year-old male has been evaluated by the Colorectal Surgery Service with Dr. Rodriguez Franklin and his operative report will need to be reviewed in detail from the Colorectal Surgery Service in [place].  Following this, the patient will be evaluated again and discussed by our surgical faculty to determine if he has symptomatic parastomal hernia can be repaired.  I am pursuing additional input from surgeons who repair these using a robotic surgical platform.  Please note this was a telephone visit due to the limitations of high tech equipment and the ability to perform a video visit.  The patient understood the limits of the telephone visit and wished to proceed.                  Again, thank you for allowing me to participate in the care of your patient.      Sincerely,    Doyle Del Real MD      "

## 2022-10-03 NOTE — PROGRESS NOTES
Zach Costa is a 67 year old who is being evaluated via a billable video visit.      How would you like to obtain your AVS? MyChart  If the video visit is dropped, the invitation should be resent by: Text to cell phone: [phone]  Will anyone else be joining your video visit? No  If patient encounters technical issues they should call [phone]    During this virtual visit the patient is located in [place], patient verifies this as the location during the entirety of this visit.     Video-Visit Details  Video Start Time: 0735    Type of service:  Video Visit    Video End Time:7:54 AM    Originating Location (pt. Location): Home  Distant Location (provider location):  [facility]  Platform used for Video Visit: MARIA Shoemaker 10/3/2022      7:08 AM    HISTORY OF PRESENT ILLNESS:  Quentin Costa is a 67-year-old male well known to the Surgery Service, who presents with a large symptomatic parastomal hernia.  The patient has difficulty pouching his ostomy with very frequent leakage and need to re-tape and cover the ostomy to prevent additional leakage throughout the day.  The patient also has discomfort and has difficulty with his daily tasks because of the size and symptomatology of the parastomal hernia.  The patient is quite interested to pursue repair and he has been encouraged to participate in smoking cessation, which he is doing and making progress on a regular basis.  He is still occasionally using #1 cigarette per day and otherwise going with none.  The patient understands the need to maintain or lose weight and to continue to prepare should surgery become an option to repair the parastomal hernia repair.       MEDICATIONS:  Current Outpatient Medications   Medication    acetaminophen (TYLENOL) 325 MG tablet    amitriptyline (ELAVIL) 10 MG tablet    aspirin (ASA) 325 MG EC tablet    baclofen (LIORESAL) 10 MG tablet    cephALEXin (KEFLEX) 500 MG capsule    ciprofloxacin  "(CIPRO) 500 MG tablet    cyclobenzaprine (FLEXERIL) 10 MG tablet    dicyclomine (BENTYL) 10 MG capsule    dicyclomine (BENTYL) 10 MG capsule    DULoxetine (CYMBALTA) 60 MG capsule    famotidine (PEPCID) 20 MG tablet    gabapentin (NEURONTIN) 300 MG capsule    hydrochlorothiazide (MICROZIDE) 12.5 MG capsule    hydrOXYzine (ATARAX) 25 MG tablet    ibuprofen (ADVIL/MOTRIN) 200 MG capsule    lactulose (CHRONULAC) 10 GM/15ML solution    linaclotide (LINZESS) 290 MCG capsule    lisinopril (ZESTRIL) 10 MG tablet    Magnesium Citrate (CITRATE OF MAGNESIA) SOLN    Magnesium Gluconate 550 MG TABS    methocarbamol (ROBAXIN) 500 MG tablet    metoclopramide (REGLAN) 10 MG tablet    multivitamin w/minerals (THERA-VIT-M) tablet    oxybutynin ER (DITROPAN XL) 10 MG 24 hr tablet    polyethylene glycol (MIRALAX) 17 GM/Dose powder    rOPINIRole (REQUIP) 1 MG tablet    senna-docusate (SENOKOT-S/PERICOLACE) 8.6-50 MG tablet    tamsulosin (FLOMAX) 0.4 MG capsule    trospium (SANCTURA) 20 MG tablet    oxyCODONE (ROXICODONE) 5 MG tablet    oxyCODONE-acetaminophen (PERCOCET) 5-325 MG tablet    traMADol (ULTRAM) 50 MG tablet     No current facility-administered medications for this visit.        ALLERGIES:  No Known Allergies     SOCIAL HISTORY:  Social History     Socioeconomic History    Marital status: Single     Spouse name: None    Number of children: None    Years of education: None    Highest education level: None   Tobacco Use    Smoking status: Current Some Day Smoker     Types: Cigarettes    Smokeless tobacco: Never Used    Tobacco comment: Trying to quit     Vital Signs: Ht 1.803 m (5' 11\")   Wt 88.5 kg (195 lb)   BMI 27.20 kg/m   No examination was possible as this was a telephone visit.      IMPRESSION:  Quentin Costa, a 67-year-old male has been evaluated by the Colorectal Surgery Service with Dr. Rodriguez Franklin and his operative report will need to be reviewed in detail from the Colorectal Surgery Service in [place].  " Following this, the patient will be evaluated again and discussed by our surgical faculty to determine if he has symptomatic parastomal hernia can be repaired.  I am pursuing additional input from surgeons who repair these using a robotic surgical platform.  Please note this was a telephone visit due to the limitations of high tech equipment and the ability to perform a video visit.  The patient understood the limits of the telephone visit and wished to proceed.

## 2022-10-03 NOTE — PATIENT INSTRUCTIONS
You met with Dr. Doyle Del Real.      Today's visit instructions:    Dr. Del Real requires you to quit smoking and using nicotine products prior to being considered for hernia repair surgery. He would like to follow-up with you via telephone on 10/28. We have scheduled this telephone appointment for you on 10/28 at 8am. If this will not work for you please call us at the Nurse Advice line listed below.       If you have questions please contact Chloé RN or Alysa RN during regular clinic hours, Monday through Friday 7:30 AM - 4:00 PM, or you can contact us via Nimbula at anytime.       If you have urgent needs after-hours, weekends, or holidays please call the hospital at [phone] and ask to speak with our on-call General Surgery Team.    Appointment [phone]  Nurse Advice (Chloé or Alysa): [phone]   Surgery Scheduler (Migue or Jeanne): [phone]  Fax: [phone]

## 2022-10-03 NOTE — NURSING NOTE
"Chief Complaint   Patient presents with     RECHECK     Hernia conference recommendations       Vitals:    10/03/22 0708   Weight: 88.5 kg (195 lb)   Height: 1.803 m (5' 11\")       Body mass index is 27.2 kg/m .                          Jonathan Nicole, EMT    "

## 2022-10-06 ENCOUNTER — PATIENT OUTREACH (OUTPATIENT)
Dept: SURGERY | Facility: CLINIC | Age: 67
End: 2022-10-06

## 2022-10-06 NOTE — PROGRESS NOTES
Dr. Villaseñor's office is unable to locate the requested Operative Report.  [facility] is also unable to find the report. Dr. Del Real updated.

## 2022-10-28 ENCOUNTER — VIRTUAL VISIT (OUTPATIENT)
Dept: SURGERY | Facility: CLINIC | Age: 67
End: 2022-10-28
Payer: COMMERCIAL

## 2022-10-28 VITALS — WEIGHT: 190 LBS | BODY MASS INDEX: 26.6 KG/M2 | HEIGHT: 71 IN

## 2022-10-28 DIAGNOSIS — F17.200 SMOKING ADDICTION: Primary | ICD-10-CM

## 2022-10-28 PROCEDURE — 99212 OFFICE O/P EST SF 10 MIN: CPT | Mod: 95 | Performed by: SURGERY

## 2022-10-28 RX ORDER — BUPROPION HYDROCHLORIDE 150 MG/1
150 TABLET ORAL EVERY MORNING
Qty: 30 TABLET | Refills: 2 | Status: SHIPPED | OUTPATIENT
Start: 2022-10-28

## 2022-10-28 ASSESSMENT — PAIN SCALES - GENERAL: PAINLEVEL: SEVERE PAIN (6)

## 2022-10-28 NOTE — PROGRESS NOTES
Zach Costa is a 67 year old who is being evaluated via a billable video visit.      How would you like to obtain your AVS? MyChart  If the video visit is dropped, the invitation should be resent by: Text to cell phone: [phone]  Will anyone else be joining your video visit? No  If patient encounters technical issues they should call [phone]    During this virtual visit the patient is located in [place], patient verifies this as the location during the entirety of this visit.     Originating Location (pt. Location): Home  Distant Location (provider location):  [facility]  Platform used for Video Visit: Telephone call    MARIA Colón 10/28/2022      6:40 AM      Surgery Clinic Staff:    This was a telephone visit.  The patient understands the use of the telephone visit to avoid COVID-19.  The patient is unable to attend to the video visit, and a telephone visit was an option that was possible today.    HISTORY OF PRESENT ILLNESS:  Mr. Quentin Costa is a 67-year-old man well known to Surgery Service who was presented to the Hernia Conference.  The patient has been encouragement to avoid smoking, reduce and quit smoking; this was also highly recommended by the faculty attendees at the monthly Hernia Meeting.   The patient understands this.  The patient understands it is possible that 2 [facility] surgeons who perform robotic surgery would be willing to proceed to a robotic repair of the patient's ventral hernia.  The patient will continue to lose weight.  He is at an acceptable weight but will need to wait for 1 month after complete smoking cessation to proceed with scheduling.  The patient will evaluated in Surgery clinic by Dr. Josias Quiroz at that time and be reassessed.  At this point, the patient has no new issues.  No evidence of strangulation.  No evidence of obstruction or other issues.  The patient is otherwise doing well and understands the need for smoking  cessation.     MEDICATIONS:  Current Outpatient Medications   Medication     acetaminophen (TYLENOL) 325 MG tablet     amitriptyline (ELAVIL) 10 MG tablet     aspirin (ASA) 325 MG EC tablet     baclofen (LIORESAL) 10 MG tablet     buPROPion (WELLBUTRIN XL) 150 MG 24 hr tablet     cephALEXin (KEFLEX) 500 MG capsule     ciprofloxacin (CIPRO) 500 MG tablet     cyclobenzaprine (FLEXERIL) 10 MG tablet     dicyclomine (BENTYL) 10 MG capsule     dicyclomine (BENTYL) 10 MG capsule     DULoxetine (CYMBALTA) 60 MG capsule     famotidine (PEPCID) 20 MG tablet     gabapentin (NEURONTIN) 300 MG capsule     hydrochlorothiazide (MICROZIDE) 12.5 MG capsule     hydrOXYzine (ATARAX) 25 MG tablet     ibuprofen (ADVIL/MOTRIN) 200 MG capsule     lactulose (CHRONULAC) 10 GM/15ML solution     linaclotide (LINZESS) 290 MCG capsule     lisinopril (ZESTRIL) 10 MG tablet     Magnesium Citrate (CITRATE OF MAGNESIA) SOLN     Magnesium Gluconate 550 MG TABS     methocarbamol (ROBAXIN) 500 MG tablet     metoclopramide (REGLAN) 10 MG tablet     multivitamin w/minerals (THERA-VIT-M) tablet     oxybutynin ER (DITROPAN XL) 10 MG 24 hr tablet     oxyCODONE (ROXICODONE) 5 MG tablet     polyethylene glycol (MIRALAX) 17 GM/Dose powder     rOPINIRole (REQUIP) 1 MG tablet     senna-docusate (SENOKOT-S/PERICOLACE) 8.6-50 MG tablet     tamsulosin (FLOMAX) 0.4 MG capsule     trospium (SANCTURA) 20 MG tablet     oxyCODONE-acetaminophen (PERCOCET) 5-325 MG tablet     traMADol (ULTRAM) 50 MG tablet     No current facility-administered medications for this visit.        ALLERGIES:  No Known Allergies     SOCIAL HISTORY:  Social History     Socioeconomic History     Marital status: Single     Spouse name: None     Number of children: None     Years of education: None     Highest education level: None   Tobacco Use     Smoking status: Some Days     Types: Cigarettes     Smokeless tobacco: Never     Tobacco comments:     Couple cigarettes a day       Status:   "1.803 m (5' 11\")   Wt 86.2 kg (190 lb)   BMI 26.50 kg/m        IMPRESSION:  Zach Costa is a 67-year-old male with a ventral hernia that is amenable to robotic repair.  The patient will be assessed with Tiffanie Quiroz and Jluis Weston in the near future.  He will call or return when he is 30 days after complete cessation of smoking.  The patient understands the need for this and the need to continue smoking cessation after surgery.      "

## 2022-10-28 NOTE — LETTER
Date:October 31, 2022      Provider requested that no letter be sent. Do not send.       [facility]

## 2022-10-28 NOTE — LETTER
10/28/2022       RE: Zach Costa  [address]  [place] [address]     Dear Colleague,    Thank you for referring your patient, Zach Costa, to the [facility] [place] [facility]. Please see a copy of my visit note below.    Zach Costa is a 67 year old who is being evaluated via a billable video visit.      How would you like to obtain your AVS? MyChart  If the video visit is dropped, the invitation should be resent by: Text to cell phone: [phone]  Will anyone else be joining your video visit? No  If patient encounters technical issues they should call [phone]    During this virtual visit the patient is located in [place], patient verifies this as the location during the entirety of this visit.     Originating Location (pt. Location): Home  Distant Location (provider location):  [facility]  Platform used for Video Visit: Telephone call    MARIA Colón 10/28/2022      6:40 AM      Surgery Clinic Staff:    This was a telephone visit.  The patient understands the use of the telephone visit to avoid COVID-19.  The patient is unable to attend to the video visit, and a telephone visit was an option that was possible today.    HISTORY OF PRESENT ILLNESS:  Mr. Quentin Costa is a 67-year-old man well known to Surgery Service who was presented to the Hernia Conference.  The patient has been encouragement to avoid smoking, reduce and quit smoking; this was also highly recommended by the faculty attendees at the monthly Hernia Meeting.   The patient understands this.  The patient understands it is possible that 2 [facility] surgeons who perform robotic surgery would be willing to proceed to a robotic repair of the patient's ventral hernia.  The patient will continue to lose weight.  He is at an acceptable weight but will need to wait for 1 month after complete smoking cessation to proceed  with scheduling.  The patient will evaluated in Surgery clinic by Dr. Josias Quiroz at that time and be reassessed.  At this point, the patient has no new issues.  No evidence of strangulation.  No evidence of obstruction or other issues.  The patient is otherwise doing well and understands the need for smoking cessation.     MEDICATIONS:  Current Outpatient Medications   Medication     acetaminophen (TYLENOL) 325 MG tablet     amitriptyline (ELAVIL) 10 MG tablet     aspirin (ASA) 325 MG EC tablet     baclofen (LIORESAL) 10 MG tablet     buPROPion (WELLBUTRIN XL) 150 MG 24 hr tablet     cephALEXin (KEFLEX) 500 MG capsule     ciprofloxacin (CIPRO) 500 MG tablet     cyclobenzaprine (FLEXERIL) 10 MG tablet     dicyclomine (BENTYL) 10 MG capsule     dicyclomine (BENTYL) 10 MG capsule     DULoxetine (CYMBALTA) 60 MG capsule     famotidine (PEPCID) 20 MG tablet     gabapentin (NEURONTIN) 300 MG capsule     hydrochlorothiazide (MICROZIDE) 12.5 MG capsule     hydrOXYzine (ATARAX) 25 MG tablet     ibuprofen (ADVIL/MOTRIN) 200 MG capsule     lactulose (CHRONULAC) 10 GM/15ML solution     linaclotide (LINZESS) 290 MCG capsule     lisinopril (ZESTRIL) 10 MG tablet     Magnesium Citrate (CITRATE OF MAGNESIA) SOLN     Magnesium Gluconate 550 MG TABS     methocarbamol (ROBAXIN) 500 MG tablet     metoclopramide (REGLAN) 10 MG tablet     multivitamin w/minerals (THERA-VIT-M) tablet     oxybutynin ER (DITROPAN XL) 10 MG 24 hr tablet     oxyCODONE (ROXICODONE) 5 MG tablet     polyethylene glycol (MIRALAX) 17 GM/Dose powder     rOPINIRole (REQUIP) 1 MG tablet     senna-docusate (SENOKOT-S/PERICOLACE) 8.6-50 MG tablet     tamsulosin (FLOMAX) 0.4 MG capsule     trospium (SANCTURA) 20 MG tablet     oxyCODONE-acetaminophen (PERCOCET) 5-325 MG tablet     traMADol (ULTRAM) 50 MG tablet     No current facility-administered medications for this visit.        ALLERGIES:  No Known Allergies     SOCIAL HISTORY:  Social History     Socioeconomic  "History     Marital status: Single     Spouse name: None     Number of children: None     Years of education: None     Highest education level: None   Tobacco Use     Smoking status: Some Days     Types: Cigarettes     Smokeless tobacco: Never     Tobacco comments:     Couple cigarettes a day       Status: Ht 1.803 m (5' 11\")   Wt 86.2 kg (190 lb)   BMI 26.50 kg/m        IMPRESSION:  Zach Costa is a 67-year-old male with a ventral hernia that is amenable to robotic repair.  The patient will be assessed with Tiffanie Quiroz and Jluis Weston in the near future.  He will call or return when he is 30 days after complete cessation of smoking.  The patient understands the need for this and the need to continue smoking cessation after surgery.          Again, thank you for allowing me to participate in the care of your patient.      Sincerely,    Doyle Del Real MD      "

## 2022-10-28 NOTE — PATIENT INSTRUCTIONS
You met with Dr. Doyle Del Real.      Today's visit instructions:    Please continue to work on smoking cessation. You have to be completely free of cigarettes and ANY products that contain nicotine before you can have surgery to repair your hernia.      Dr. Josias Quiroz would like to see you in clinic one month after you have achieved your smoking cessation goal. Dr. Quiroz is a surgeon who can repair your hernia via robot which Dr. Del Real feels is the safest method of surgery for you.     If you have questions please contact Chloé RN or Alysa RN during regular clinic hours, Monday through Friday 7:30 AM - 4:00 PM, or you can contact us via Melophone at anytime.       If you have urgent needs after-hours, weekends, or holidays please call the hospital at [phone] and ask to speak with our on-call General Surgery Team.    Appointment [phone]  Nurse Advice (Chloé or Alysa): [phone]   Surgery Scheduler (Migue or Jeanne): [phone]  Fax: [phone]

## 2022-10-28 NOTE — NURSING NOTE
"Chief Complaint   Patient presents with     RECHECK     Follow up on smoking cessation       Vitals:    10/28/22 0640   Weight: 86.2 kg (190 lb)   Height: 1.803 m (5' 11\")       Body mass index is 26.5 kg/m .                          Jonathan Nicole, EMT    "